# Patient Record
Sex: FEMALE | URBAN - METROPOLITAN AREA
[De-identification: names, ages, dates, MRNs, and addresses within clinical notes are randomized per-mention and may not be internally consistent; named-entity substitution may affect disease eponyms.]

---

## 2021-12-28 ENCOUNTER — PREPPED CHART (OUTPATIENT)
Dept: URBAN - METROPOLITAN AREA CLINIC 20 | Facility: CLINIC | Age: 56
End: 2021-12-28

## 2021-12-28 NOTE — PATIENT DISCUSSION
Recommended and instructed patient on hot compresses with a microwaveable eye mask and lid scrubs, BID, OU.

## 2021-12-28 NOTE — PATIENT DISCUSSION
START: - LID N LASH WIPES AT THE BASE OF THE LASH LINE EVERY NIGHT -* HYPOCHLOR SPRAY 1-2 SPRAYS TO EACH EYE WHILE CLOSED EVERY NIGHT. RUB IN EXCESS INTO LIDS -* RETAINE OR OASIS TEARS:1 DROP AT THE END OF THE LID HYGIENE NIGHTLY ROUTINE. ALSO USE 2-3 TIMES THROUGHOUT THE DAY.